# Patient Record
Sex: FEMALE | Race: WHITE | NOT HISPANIC OR LATINO | Employment: OTHER | URBAN - METROPOLITAN AREA
[De-identification: names, ages, dates, MRNs, and addresses within clinical notes are randomized per-mention and may not be internally consistent; named-entity substitution may affect disease eponyms.]

---

## 2018-08-08 ENCOUNTER — TRANSCRIBE ORDERS (OUTPATIENT)
Dept: ADMINISTRATIVE | Facility: HOSPITAL | Age: 68
End: 2018-08-08

## 2018-08-08 DIAGNOSIS — Z12.39 SCREENING BREAST EXAMINATION: Primary | ICD-10-CM

## 2018-08-22 ENCOUNTER — APPOINTMENT (OUTPATIENT)
Dept: WOMENS IMAGING | Facility: HOSPITAL | Age: 68
End: 2018-08-22

## 2018-08-22 PROCEDURE — 77080 DXA BONE DENSITY AXIAL: CPT | Performed by: RADIOLOGY

## 2018-08-22 PROCEDURE — 77067 SCR MAMMO BI INCL CAD: CPT | Performed by: RADIOLOGY

## 2018-08-22 PROCEDURE — 77063 BREAST TOMOSYNTHESIS BI: CPT | Performed by: RADIOLOGY

## 2019-12-26 ENCOUNTER — APPOINTMENT (OUTPATIENT)
Dept: WOMENS IMAGING | Facility: HOSPITAL | Age: 69
End: 2019-12-26

## 2019-12-26 PROCEDURE — 77067 SCR MAMMO BI INCL CAD: CPT | Performed by: RADIOLOGY

## 2019-12-26 PROCEDURE — 77063 BREAST TOMOSYNTHESIS BI: CPT | Performed by: RADIOLOGY

## 2021-01-11 ENCOUNTER — APPOINTMENT (OUTPATIENT)
Dept: WOMENS IMAGING | Facility: HOSPITAL | Age: 71
End: 2021-01-11

## 2021-01-11 PROCEDURE — 77063 BREAST TOMOSYNTHESIS BI: CPT | Performed by: RADIOLOGY

## 2021-01-11 PROCEDURE — 77067 SCR MAMMO BI INCL CAD: CPT | Performed by: RADIOLOGY

## 2021-03-22 ENCOUNTER — BULK ORDERING (OUTPATIENT)
Dept: CASE MANAGEMENT | Facility: OTHER | Age: 71
End: 2021-03-22

## 2021-03-22 DIAGNOSIS — Z23 IMMUNIZATION DUE: ICD-10-CM

## 2022-04-08 ENCOUNTER — APPOINTMENT (OUTPATIENT)
Dept: WOMENS IMAGING | Facility: HOSPITAL | Age: 72
End: 2022-04-08

## 2022-04-08 PROCEDURE — 77063 BREAST TOMOSYNTHESIS BI: CPT | Performed by: RADIOLOGY

## 2022-04-08 PROCEDURE — 77067 SCR MAMMO BI INCL CAD: CPT | Performed by: RADIOLOGY

## 2022-07-25 ENCOUNTER — APPOINTMENT (OUTPATIENT)
Dept: WOMENS IMAGING | Facility: HOSPITAL | Age: 72
End: 2022-07-25

## 2022-07-25 PROCEDURE — 77080 DXA BONE DENSITY AXIAL: CPT | Performed by: RADIOLOGY

## 2024-07-17 ENCOUNTER — APPOINTMENT (OUTPATIENT)
Dept: WOMENS IMAGING | Facility: HOSPITAL | Age: 74
End: 2024-07-17
Payer: MEDICARE

## 2024-07-17 PROCEDURE — 77067 SCR MAMMO BI INCL CAD: CPT | Performed by: RADIOLOGY

## 2024-07-17 PROCEDURE — 77063 BREAST TOMOSYNTHESIS BI: CPT | Performed by: RADIOLOGY

## 2025-04-17 ENCOUNTER — APPOINTMENT (OUTPATIENT)
Dept: ULTRASOUND IMAGING | Facility: HOSPITAL | Age: 75
End: 2025-04-17
Payer: MEDICARE

## 2025-04-17 ENCOUNTER — HOSPITAL ENCOUNTER (EMERGENCY)
Facility: HOSPITAL | Age: 75
Discharge: HOME | End: 2025-04-17
Attending: STUDENT IN AN ORGANIZED HEALTH CARE EDUCATION/TRAINING PROGRAM
Payer: MEDICARE

## 2025-04-17 VITALS
HEART RATE: 71 BPM | TEMPERATURE: 97.9 F | DIASTOLIC BLOOD PRESSURE: 89 MMHG | WEIGHT: 180 LBS | BODY MASS INDEX: 27.28 KG/M2 | HEIGHT: 68 IN | OXYGEN SATURATION: 99 % | RESPIRATION RATE: 16 BRPM | SYSTOLIC BLOOD PRESSURE: 178 MMHG

## 2025-04-17 DIAGNOSIS — M25.562 ACUTE PAIN OF LEFT KNEE: Primary | ICD-10-CM

## 2025-04-17 PROCEDURE — 99283 EMERGENCY DEPT VISIT LOW MDM: CPT | Performed by: NURSE PRACTITIONER

## 2025-04-17 PROCEDURE — 99284 EMERGENCY DEPT VISIT MOD MDM: CPT

## 2025-04-17 PROCEDURE — 93971 EXTREMITY STUDY: CPT

## 2025-04-18 NOTE — FSED PROVIDER NOTE
Subjective   History of Present Illness  74 yr old female presents with C/O knot behind her left knee and pain off and in the area.  She is worried it is a DVT and wants to have it checked.  She has been having the pain off and on for the past 4-6 weeks.  Denies numbness or tingling in lower extremity.  She says her ankles sometimes swell but not recently.  No known injury to left.        Review of Systems   Constitutional: Negative.    Respiratory: Negative.     Cardiovascular: Negative.    Genitourinary: Negative.    Musculoskeletal:  Positive for arthralgias.   Skin: Negative.    Neurological: Negative.        History reviewed. No pertinent past medical history.    Allergies   Allergen Reactions    No Known Drug Allergy        Past Surgical History:   Procedure Laterality Date    CERVICAL CONIZATION      PALATE TO GINGIVA GRAFT         Family History   Problem Relation Age of Onset    Cancer Mother         cervical    Heart disease Mother     Heart attack Father     Alcohol abuse Father     Cancer Sister         cervical       Social History     Socioeconomic History    Marital status: Single   Tobacco Use    Smoking status: Never    Smokeless tobacco: Never   Substance and Sexual Activity    Alcohol use: Yes     Alcohol/week: 2.0 standard drinks of alcohol     Types: 2 Glasses of wine per week    Drug use: No           Objective   Physical Exam  Vitals and nursing note reviewed.   Constitutional:       Appearance: Normal appearance. She is normal weight.   Pulmonary:      Effort: Pulmonary effort is normal.      Breath sounds: Normal breath sounds and air entry.   Musculoskeletal:      Left knee: Tenderness present. Normal pulse.      Comments: Full ROM left leg and knee but says it hurts behind the knee to bend it.  No swelling or redness.  No warmth.  Negative Kyle's sign.     Neurological:      Mental Status: She is alert.         Procedures           ED Course  ED Course as of 04/17/25 2313   Thu Apr 17,  2025 2200 US Venous Doppler Lower Extremity Left (duplex)  Reviewed results with patient.  No DVT, no baker's cyst.  No emergent findings.  Will have her follow up with ortho for further evaluation. [JJ]      ED Course User Index  [JJ] Delmy Dye, CIRO                                         Medical Decision Making      Final diagnoses:   Acute pain of left knee       ED Disposition  ED Disposition       ED Disposition   Discharge    Condition   Stable    Comment   --               Diony Peters MD  [address] [address]  [phone]    Call in 1 day  schedule an appointment         Medication List      No changes were made to your prescriptions during this visit.

## 2025-04-23 ENCOUNTER — TELEPHONE (OUTPATIENT)
Dept: ORTHOPEDIC SURGERY | Facility: CLINIC | Age: 75
End: 2025-04-23

## 2025-04-23 NOTE — TELEPHONE ENCOUNTER
Hub staff attempted to follow warm transfer process and was unsuccessful     Caller: Lamar Abel    Relationship to patient: Self    Best call back number: [phone]    Patient is needing: PT STATES SHE MISSED A CALL TO RESCHEDULE APPT WITH DR DISLA. UNABLE TO LOCATE APPT OR MSG IN CHART. APPEARS TO BE IN RELATION TO REFERRAL FROM 04/17/25. PLEASE CONTACT PT TO DISCUSS.

## 2025-04-25 ENCOUNTER — OFFICE VISIT (OUTPATIENT)
Dept: ORTHOPEDIC SURGERY | Facility: CLINIC | Age: 75
End: 2025-04-25
Payer: MEDICARE

## 2025-04-25 VITALS — BODY MASS INDEX: 27.96 KG/M2 | HEIGHT: 68 IN | TEMPERATURE: 98.4 F | WEIGHT: 184.5 LBS

## 2025-04-25 DIAGNOSIS — R52 PAIN: Primary | ICD-10-CM

## 2025-04-25 DIAGNOSIS — M17.10 ARTHRITIS OF KNEE: ICD-10-CM

## 2025-04-25 RX ORDER — METHYLPREDNISOLONE ACETATE 80 MG/ML
80 INJECTION, SUSPENSION INTRA-ARTICULAR; INTRALESIONAL; INTRAMUSCULAR; SOFT TISSUE
Status: COMPLETED | OUTPATIENT
Start: 2025-04-25 | End: 2025-04-25

## 2025-04-25 RX ORDER — LIDOCAINE HYDROCHLORIDE 10 MG/ML
2 INJECTION, SOLUTION EPIDURAL; INFILTRATION; INTRACAUDAL; PERINEURAL
Status: COMPLETED | OUTPATIENT
Start: 2025-04-25 | End: 2025-04-25

## 2025-04-25 RX ORDER — LEVOTHYROXINE SODIUM 75 UG/1
75 TABLET ORAL
COMMUNITY

## 2025-04-25 RX ORDER — CHOLECALCIFEROL (VITAMIN D3) 25 MCG
1000 TABLET ORAL DAILY
COMMUNITY

## 2025-04-25 RX ADMIN — LIDOCAINE HYDROCHLORIDE 2 ML: 10 INJECTION, SOLUTION EPIDURAL; INFILTRATION; INTRACAUDAL; PERINEURAL at 11:15

## 2025-04-25 RX ADMIN — METHYLPREDNISOLONE ACETATE 80 MG: 80 INJECTION, SUSPENSION INTRA-ARTICULAR; INTRALESIONAL; INTRAMUSCULAR; SOFT TISSUE at 11:15

## 2025-04-25 NOTE — PROGRESS NOTES
Patient: Lamar Abel    YOB: 1950    Medical Record Number: [id number]    Chief Complaints:  Left knee pain    History of Present Illness:     75 y.o. female patient who presents for evaluation of left knee pain. She reports that the symptoms first began about 2 months ago.  She recalls that she was doing a lot of leg extensions in the gym and the knee started to bother her afterwards.  Unfortunately, she has continued to have pain in the back of her knee ever since.  Current pain is described as moderate and aching.  She denies any clicking, popping or catching.  Symptoms are worse with activity.  Symptoms are somewhat better with rest and anti-inflammatories.  Denies any shooting pain down the legs, weakness, numbness or paresthesias.  She has been doing some online research and she is concerned that she may have a Baker's cyst.  She did have a negative duplex ultrasound just about a week ago.    Allergies: No Known Allergies    Home Medications    Current Outpatient Medications:     [facility] Natural Products (BEET ROOT PO), Take  by mouth., Disp: , Rfl:     acyclovir (ZOVIRAX) 200 MG capsule, Take 1 capsule by mouth 5 (five) times a day., Disp: 25 capsule, Rfl: 3    cholecalciferol (SM Vitamin D3) 25 MCG (1000 UT) tablet, Take 1 tablet by mouth Daily., Disp: , Rfl:     CYANOCOBALAMIN PO, Take  by mouth., Disp: , Rfl:     Glucosamine-Chondroitin 750-600 MG tablet, Take  by mouth., Disp: , Rfl:     levothyroxine (SYNTHROID, LEVOTHROID) 75 MCG tablet, Take 1 tablet by mouth., Disp: , Rfl:     Multiple Vitamin (MULTI VITAMIN DAILY PO), Take  by mouth., Disp: , Rfl:     Vaginal Lubricant (REPLENS) gel, Insert  into the vagina., Disp: , Rfl:     History reviewed. No pertinent past medical history.    Past Surgical History:   Procedure Laterality Date    CERVICAL CONIZATION      PALATE TO GINGIVA GRAFT         Social History     Occupational History    Not on file   Tobacco Use    Smoking status: Never  "    Passive exposure: Never    Smokeless tobacco: Never   Vaping Use    Vaping status: Never Used   Substance and Sexual Activity    Alcohol use: Yes     Alcohol/week: 2.0 standard drinks of alcohol     Types: 2 Glasses of wine per week    Drug use: No    Sexual activity: Defer      Social History     Social History Narrative    Not on file       Family History   Problem Relation Age of Onset    Cancer Mother         cervical    Heart disease Mother     Heart attack Father     Alcohol abuse Father     Cancer Sister         cervical       Review of Systems:      Constitutional: Denies fever, shaking or chills   Eyes: Denies change in visual acuity   HEENT: Denies nasal congestion or sore throat   Respiratory: Denies cough or shortness of breath   Cardiovascular: Denies chest pain or edema  Endocrine: Denies tremors, palpitations, intolerance of heat or cold, polyuria, polydipsia.  GI: Denies abdominal pain, nausea, vomiting, bloody stools or diarrhea  : Denies frequency, urgency, incontinence, retention, or nocturia.  Musculoskeletal: Denies numbness, tingling or loss of motor function except as above  Integument: Denies rash, lesion or ulceration   Neurologic: Denies headache or focal weakness, deficits  Heme: Denies spontaneous or excessive bleeding, epistaxis, hematuria, melena, fatigue, enlarged or tender lymph nodes.      All other pertinent positives and negatives as noted above in HPI.    Physical Exam:   75 y.o. female  Vitals:    04/25/25 1057   Temp: 98.4 °F (36.9 °C)   Weight: 83.7 kg (184 lb 8 oz)   Height: 172.7 cm (68\")     General:  Patient is awake and alert.  Appears in no acute distress or discomfort.    Psych:  Affect and demeanor are appropriate.    Eyes:  Conjunctiva and sclera appear grossly normal.  Eyes track well and EOM seem to be intact.    Ears:  No gross abnormalities.  Hearing adequate for the exam.    Cardiovascular:  Regular rate and rhythm.    Lungs:  Good chest expansion.  " Breathing unlabored.    Spine:  Back appears grossly normal.  No palpable masses or adenopathy.  Good motion.  Straight leg raise and crossed straight leg raise maneuver are both negative for lower leg and/or knee pain.    Extremities:  Left knee is examined.  Skin is benign.  No obvious gross abnormalities.  No palpable masses or adenopathy.  Mild tenderness noted over medial joint line.  Motion is full and symmetric to the other side.  She has a positive medial Tess's.  Negative lateral Tess's.  No instability.  Strength is well preserved including hip flexion, knee extension, ankle and toe plantarflexion, ankle inversion and eversion.  Good sensory function throughout the leg and foot.  Palpable pulses.  Brisk capillary refill.  Good skin turgor.         Radiology:   Bilateral standing AP views, bilateral merchants views and a lateral view of the left knee are ordered by myself and reviewed to evaluate the patient's complaint.  No comparison films are immediately available.  The x-rays show some subtle osseous debris and early osteophyte formation consistent with early osteoarthritis.  Her joint spaces appear well-preserved.  I do not see any other concerning findings.    I did review the report of her duplex ultrasound from April 17.  This was read as negative.  No Sommer's cyst was noted.    Assessment/Plan:  Left knee osteoarthritis and possible medial meniscal tear    We discussed her suspected diagnosis and the natural history of these conditions.  We discussed treatment options in detail including the risks, benefits, and alternatives of conservative treatment versus surgical options.  Regarding conservative treatment, we discussed appropriate activity modifications, anti-inflammatories, injections (including both corticosteroids and visco supplementation), and physical therapy.  She acknowledged understanding of the information and elected for an injection.  The risk, benefits and alternatives to a  left knee injection were discussed.  The patient consented and the injection was performed as described below.  I also gave her a referral to PT.  I recommended she give this course of treatment roughly 6 weeks and then contact me if no better.  Going forward, she we will follow-up as needed.      Large Joint Arthrocentesis: L knee  Date/Time: 4/25/2025 11:15 AM  Consent given by: patient  Site marked: site marked  Timeout: Immediately prior to procedure a time out was called to verify the correct patient, procedure, equipment, support staff and site/side marked as required   Supporting Documentation  Indications: pain   Procedure Details  Location: knee - L knee  Preparation: Patient was prepped and draped in the usual sterile fashion  Needle gauge: 21 G.  Approach: anterolateral  Medications administered: 2 mL lidocaine PF 1% 1 %; 80 mg methylPREDNISolone acetate 80 MG/ML  Patient tolerance: patient tolerated the procedure well with no immediate complications        Diony Peters MD    04/25/2025    CC to Sung Purvis MD

## 2025-05-06 ENCOUNTER — TELEPHONE (OUTPATIENT)
Dept: ORTHOPEDIC SURGERY | Facility: CLINIC | Age: 75
End: 2025-05-06

## 2025-05-06 NOTE — TELEPHONE ENCOUNTER
Provider: NAIF    Caller: Lamar Abel    Relationship to Patient: Self    Pharmacy:     Phone Number: [phone]    Reason for Call: PT CALLED TO RETURN ИВАН AYERS

## 2025-05-08 ENCOUNTER — TREATMENT (OUTPATIENT)
Dept: PHYSICAL THERAPY | Facility: CLINIC | Age: 75
End: 2025-05-08
Payer: MEDICARE

## 2025-05-08 DIAGNOSIS — R68.89 ACTIVITY INTOLERANCE: ICD-10-CM

## 2025-05-08 DIAGNOSIS — M62.89 HAMSTRING TIGHTNESS OF LEFT LOWER EXTREMITY: ICD-10-CM

## 2025-05-08 DIAGNOSIS — M17.12 OSTEOARTHRITIS OF LEFT KNEE, UNSPECIFIED OSTEOARTHRITIS TYPE: Primary | ICD-10-CM

## 2025-05-08 NOTE — PROGRESS NOTES
Physical Therapy Initial Evaluation and Plan of Care  [facility] [place]   [address], [address]  [place] [address]  P: [phone]  F: [phone]    Patient: Lamar Abel   : 1950  Diagnosis/ICD-10 Code:  Osteoarthritis of left knee, unspecified osteoarthritis type [M17.12]  Referring practitioner: Diony Peters MD  Date of Initial Visit: 2025  Today's Date: 2025  Patient seen for 1 session         Visit Diagnoses:    ICD-10-CM ICD-9-CM   1. Osteoarthritis of left knee, unspecified osteoarthritis type  M17.12 715.96   2. Hamstring tightness of left lower extremity  M62.89 728.9   3. Activity intolerance  R68.89 780.99       PMHx Reviewed : 2025      Subjective Evaluation    History of Present Illness  Mechanism of injury: Hx:   Pt reports to clinic for IE of L knee arthritis. Pt reports she feels she may have an issue w/ her mensicus as well. Pt notes this started back in Feb. Pt was working out and felt pain in her knee the next day. Pt saw ortho and had Xray done. Pt has been wearing a knee sleeve which has been helpful. Pt denies any N/T in her leg.     PMH includes:  - N/A    Pt reported difficulties:  - self reported fxn status =  90/100%  - walking (1.5 mile before pain, used to walk 3-4)  - stairs (up)      Hobbies (impacted by dysfxn):  - gym (machine circuit and treadmill)       Patient Occupation: retired Quality of life: excellent    Pain  Current pain ratin  At best pain ratin  At worst pain ratin  Location: back of knee  Quality: tight and dull ache  Relieving factors: support  Aggravating factors: stairs and ambulation  Progression: improved    Diagnostic Tests  Abnormal x-ray: OA.    Patient Goals  Patient goals for therapy: return to sport/leisure activities             Objective          Tenderness   Left Knee   Tenderness in the medial joint line.     Active Range of Motion   Left Knee   Flexion: 130 degrees   Prone  flexion: 0 degrees     Right Knee   Flexion: 130 degrees   Prone flexion: 0 degrees     Patellar Mobility   Left Knee Patellar tendons within functional limits include the medial, lateral, superior and inferior.     Right Knee Patellar tendons within functional limits include the medial, lateral, superior and inferior.     Strength/Myotome Testing     Left Hip   Planes of Motion   Flexion: 4  Abduction: 5  Adduction: 5  External rotation: 4+  Internal rotation: 4+    Right Hip   Planes of Motion   Flexion: 4  Abduction: 5  Adduction: 5  External rotation: 4+  Internal rotation: 4+    Left Knee   Flexion: 5  Extension: 5    Right Knee   Flexion: 5  Extension: 5    Tests     Left Knee   Negative lateral Tess, medial Tess, valgus stress test at 0 degrees, valgus stress test at 30 degrees, varus stress test at 0 degrees and varus stress test at 30 degrees.     Right Knee   Negative lateral Tess, medial Tess, Thessaly's test at 20 degrees, valgus stress test at 0 degrees, valgus stress test at 30 degrees, varus stress test at 0 degrees and varus stress test at 30 degrees.     Additional Tests Details  90/90 HS   L = 25   R = 10    Ambulation     Ambulation: Stairs     Additional Stairs Ambulation Details  Slight pain w/ descent of stairs     Observational Gait   Gait: within functional limits           Assessment & Plan       Assessment  Impairments: abnormal coordination, abnormal gait, abnormal or restricted ROM, activity intolerance, impaired balance, impaired physical strength, lacks appropriate home exercise program, pain with function and weight-bearing intolerance   Functional limitations: carrying objects, lifting, walking, pulling, pushing, uncomfortable because of pain, sitting, standing and unable to perform repetitive tasks   Assessment details: Pt presents to clinic for IE of L knee arthritis. Pt demonstrates expected clinical presentation of LE weakness, decreased ROM, pain and activity  intolerance, gait/balance disturbances. Pt negative for meniscus pathology testing. Clinical examination findings suggestive of potential L hamstring strain. Pt will benefit from skilled PT services at this time to address found dysfxn in order to achieve outlined goals in POC for pt fxn mobility, safety, and return to PLOF.   Prognosis: good    Goals  Plan Goals: STG [achieve in 4 wks]   1. Pt will be compliant w/ HEP and attendance w/ scheduled therapy sessions.   2. Pt will demonstrate 4+/5 LE strength in order to improve gait, fxn mobility, and activity level.  3. Pt will increase L HS ROM in 90/90 test to symmetrical degrees of R for improved fxn mobility in completion of IADLs and return to or exceed PLOF.   4. Pt will report decreased pain levels from 7/10 at worst to 5/10 or less for pt QoL and return to PLOF.               Plan  Therapy options: will be seen for skilled therapy services  Planned modality interventions: cryotherapy, dry needling, iontophoresis and TENS  Planned therapy interventions: abdominal trunk stabilization, balance/weight-bearing training, body mechanics training, fine motor coordination training, flexibility, functional ROM exercises, gait training, home exercise program, joint mobilization, therapeutic activities, stretching, strengthening, postural training, orthotic fitting/training, neuromuscular re-education and motor coordination training  Frequency: 1x week  Duration in weeks: 4  Treatment plan discussed with: patient          Manual Therapy:     0     mins  78258;  Therapeutic Exercise:     20     mins  84447;     Neuromuscular Jennifer:       8    mins  20649;    Therapeutic Activity:      10     mins  67747;     Gait Trainin     mins  98827;     Ultrasound:      0     mins  19214;    Electrical Stimulation:     0     mins  97483 ( );  Dry Needling      0     mins self-pay  Traction      0     mins 01037  Canalith Repositioning    0     mins 16333      Timed  Treatment:   38   mins   Total Treatment:     58   mins      PT: Bethel Alcocer PT     License Number: [id number]  Electronically signed by Bethel Alcocer PT, 05/08/25, 1:51 PM EDT    Certification Period: 5/8/2025 thru 8/5/2025  I certify that the therapy services are furnished while this patient is under my care.  The services outlined above are required by this patient, and will be reviewed every 90 days.         Physician Signature:__________________________________________________    PHYSICIAN: Diony Peters MD  NPI: [id number]                                      DATE:      Please sign in Epic or return via fax to [email or web address] . Thank you, [facility].

## 2025-05-21 ENCOUNTER — TELEPHONE (OUTPATIENT)
Dept: ORTHOPEDIC SURGERY | Facility: CLINIC | Age: 75
End: 2025-05-21
Payer: MEDICARE

## 2025-05-21 NOTE — TELEPHONE ENCOUNTER
Caller: LING    Relationship: [employer]     Best call back number:     What form or medical record are you requesting: PA FOR  LUMBAR, BILATERAL KNEE AND LEFT SHOULDER BRACES    Who is requesting this form or medical record from you: [employer]    How would you like to receive the form or medical records (pick-up, mail, fax): FAX  If fax, what is the fax number: [phone]    Timeframe paperwork needed: ASAP    Additional notes: LING STATED SHE FAXED PRIOR AUTH THAT NEEDS TO BE SIGNED AND RETURNED ON 5/16/25

## 2025-05-23 ENCOUNTER — TELEPHONE (OUTPATIENT)
Dept: ORTHOPEDIC SURGERY | Facility: CLINIC | Age: 75
End: 2025-05-23
Payer: MEDICARE

## 2025-05-23 NOTE — TELEPHONE ENCOUNTER
Caller: ANDREA    Relationship to patient: Other    Best call back number: [phone]    Patient is needing: ANDREA WITH [employer] CALLED STATING THEY RECEIVED A FAX TODAY FOR AN ORDER OF ORTHOTICS BUT ONE OF THE PAGES IS NOT LEGIBLE   THEY ARE ASKING THAT IT BE RE-SCANNED AND SENT AGAIN TODAY

## 2025-05-27 ENCOUNTER — TREATMENT (OUTPATIENT)
Dept: PHYSICAL THERAPY | Facility: CLINIC | Age: 75
End: 2025-05-27
Payer: MEDICARE

## 2025-05-27 DIAGNOSIS — R68.89 ACTIVITY INTOLERANCE: ICD-10-CM

## 2025-05-27 DIAGNOSIS — M17.12 OSTEOARTHRITIS OF LEFT KNEE, UNSPECIFIED OSTEOARTHRITIS TYPE: Primary | ICD-10-CM

## 2025-05-27 DIAGNOSIS — M62.89 HAMSTRING TIGHTNESS OF LEFT LOWER EXTREMITY: ICD-10-CM

## 2025-05-27 PROCEDURE — 97110 THERAPEUTIC EXERCISES: CPT | Performed by: PHYSICAL THERAPIST

## 2025-05-27 PROCEDURE — 97112 NEUROMUSCULAR REEDUCATION: CPT | Performed by: PHYSICAL THERAPIST

## 2025-05-27 PROCEDURE — 97164 PT RE-EVAL EST PLAN CARE: CPT | Performed by: PHYSICAL THERAPIST

## 2025-05-27 NOTE — PROGRESS NOTES
Physical Therapy Re-eval/Discharge  [facility] [place]   [address], [address]  [place] [address]  P: [phone]  F: [phone]    Patient: Lamar Abel   : 1950  Referring practitioner: Diony Peters MD  Date of Initial Visit: Type: THERAPY  Noted: 2025  Today's Date: 2025  Patient seen for 2 sessions       Visit Diagnoses:    ICD-10-CM ICD-9-CM   1. Osteoarthritis of left knee, unspecified osteoarthritis type  M17.12 715.96   2. Hamstring tightness of left lower extremity  M62.89 728.9   3. Activity intolerance  R68.89 780.99         Lamar Abel reports: Pt reports she is doing very well. Pt notes her pain has decreased quite a bit w/ only some occasional mild discomfort now. Pt has been doing her exercises most of the time but had some GI issues that made her miss a few times. No new/other complaints/comments noted.       Subjective     Objective   See Exercise, Manual, and Modality Logs for complete treatment.      Strength/Myotome Testing      Left Hip   Planes of Motion   Flexion: 4 = 4+  Abduction: 5  Adduction: 5  External rotation: 4+ = 5  Internal rotation: 4+ = 5     Right Hip   Planes of Motion   Flexion: 4 = 4+  Abduction: 5  Adduction: 5  External rotation: 4+ =5  Internal rotation: 4+ = 5     Left Knee   Flexion: 5  Extension: 5     Right Knee   Flexion: 5  Extension: 5     Tests      Left Knee   Negative lateral Tess, medial Tess, valgus stress test at 0 degrees, valgus stress test at 30 degrees, varus stress test at 0 degrees and varus stress test at 30 degrees.      Right Knee   Negative lateral Tess, medial Tess, Thessaly's test at 20 degrees, valgus stress test at 0 degrees, valgus stress test at 30 degrees, varus stress test at 0 degrees and varus stress test at 30 degrees.      Additional Tests Details  90/90 HS   L = 25 = 10  R = 10    Assessment/Plan  Pt was re-evaluated for potential d/c today. Pt has met  100% of goals from PoC. Pt is d/c from skilled PT services at this time w/ updated HEP. Therapist and patient are in agreement w/ this plan.       Goals  Plan Goals: STG [achieve in 4 wks]   1. Pt will be compliant w/ HEP and attendance w/ scheduled therapy sessions. = MET  2. Pt will demonstrate 4+/5 LE strength in order to improve gait, fxn mobility, and activity level. = MET  3. Pt will increase L HS ROM in 90/90 test to symmetrical degrees of R for improved fxn mobility in completion of IADLs and return to or exceed PLOF. = MET  4. Pt will report decreased pain levels from 7/10 at worst to 5/10 or less for pt QoL and return to PLOF. = MET (2/10)    Timed:         Manual Therapy:    0     mins  49301;     Therapeutic Exercise:    13     mins  07185;     Neuromuscular Jennifer:    10    mins  67600;    Therapeutic Activity:     0     mins  85844;     Gait Trainin     mins  69442;     Ultrasound:     0     mins  48377;    Ionto                               0    mins  58642  Self Care                       0     mins  30284  Traction     0     mins 66764      Un-Timed:  Canalith Repos    0     mins 78066  Electrical Stimulation:    0     mins  78568 ( );  Dry Needling     0     mins self-pay  Traction     0     mins 23343  PT Re-eval                       8       mins 02517       Timed Treatment:   23   mins   Total Treatment:     31   mins    Bethel Alcocer, PT  [place] License #: [id number]    Physical Therapist

## 2025-05-29 NOTE — TELEPHONE ENCOUNTER
"Caller: LING    Relationship: Other    Best call back number: [phone]    Who is requesting this form or medical record from you: [employer]    How would you like to receive the form or medical records (pick-up, mail, fax): FAX  If fax, what is the fax number: [phone]    Timeframe paperwork needed: ASAP    Additional notes: LING WITH [employer] CALLING TO RELAY THAT SHE FAXED OVER THE \"MAIN FORM\" FROM THIS THREAD TO BE SIGNED BY DR DISLA. LING STATES THIS MAIN FORM WAS NOT SIGNED WHEN PAPERWORK WAS SENT BACK ORIGINALLY.      "

## 2025-05-30 NOTE — TELEPHONE ENCOUNTER
I CALLED THE PATIENT THIS MORNING REGARDING [employer] AND SHE TOLD ME TO DISREGARD DUE TO HER HAVING MEDICARE AND THAT THIS [employer] IS A SCAM, I TRIED CALLING CORY COLLADO WITH [employer] BUT NO ONE ANSWERED AND I WAS NOT ABLE TO LEAVE A VOICE MAIL, I WILL TURN THIS OVER TO MY SUPERVISOR.

## 2025-06-17 ENCOUNTER — OFFICE (OUTPATIENT)
Dept: URBAN - METROPOLITAN AREA CLINIC 76 | Facility: CLINIC | Age: 75
End: 2025-06-17
Payer: MEDICARE

## 2025-06-17 VITALS
HEIGHT: 68 IN | SYSTOLIC BLOOD PRESSURE: 142 MMHG | WEIGHT: 175 LBS | DIASTOLIC BLOOD PRESSURE: 84 MMHG | OXYGEN SATURATION: 99 % | HEART RATE: 77 BPM

## 2025-06-17 DIAGNOSIS — R93.3 ABNORMAL FINDINGS ON DIAGNOSTIC IMAGING OF OTHER PARTS OF DI: ICD-10-CM

## 2025-06-17 DIAGNOSIS — K92.89 OTHER SPECIFIED DISEASES OF THE DIGESTIVE SYSTEM: ICD-10-CM

## 2025-06-17 DIAGNOSIS — R19.4 CHANGE IN BOWEL HABIT: ICD-10-CM

## 2025-06-17 DIAGNOSIS — K57.90 DIVERTICULOSIS OF INTESTINE, PART UNSPECIFIED, WITHOUT PERFO: ICD-10-CM

## 2025-06-17 DIAGNOSIS — K62.5 HEMORRHAGE OF ANUS AND RECTUM: ICD-10-CM

## 2025-06-17 PROCEDURE — 99204 OFFICE O/P NEW MOD 45 MIN: CPT | Performed by: INTERNAL MEDICINE

## 2025-06-17 NOTE — SERVICEHPINOTES
Ms. Wing is a very pleasant, healthy 75-year-old female with a history of diverticular disease, thyroid disease and mild sleep apnea.  Her last colonoscopy was in 2013, she had a cold garden 2023.  She's here now however because she had a change in bowel habits a month ago, she's had increased bright red blood per rectum and passage of mucus.  She's had gas and bloating.  She's had these bowel changes in spite of the fact that there is no change in her diet.  She's had abdominal pain, workup she had a CT scan done in May and did have sigmoid inflammation which was read as sigmoid colitis versus possible diverticulitis.  She was treated with antibiotics.  She was on Flagyl which caused nausea.  She's had some constipation.  She's been using MiraLAX. There is no first-degree relatives of polyps colitis or colon cancer.  She is in no acute distress.  She is on no nonsteroidals or blood thinners.

## 2025-07-03 VITALS
HEART RATE: 60 BPM | HEART RATE: 66 BPM | HEART RATE: 59 BPM | HEART RATE: 61 BPM | RESPIRATION RATE: 15 BRPM | OXYGEN SATURATION: 94 % | SYSTOLIC BLOOD PRESSURE: 134 MMHG | SYSTOLIC BLOOD PRESSURE: 174 MMHG | TEMPERATURE: 97.1 F | SYSTOLIC BLOOD PRESSURE: 169 MMHG | OXYGEN SATURATION: 97 % | SYSTOLIC BLOOD PRESSURE: 175 MMHG | HEART RATE: 69 BPM | HEART RATE: 75 BPM | SYSTOLIC BLOOD PRESSURE: 140 MMHG | SYSTOLIC BLOOD PRESSURE: 188 MMHG | DIASTOLIC BLOOD PRESSURE: 99 MMHG | WEIGHT: 173 LBS | SYSTOLIC BLOOD PRESSURE: 153 MMHG | HEART RATE: 67 BPM | HEART RATE: 64 BPM | DIASTOLIC BLOOD PRESSURE: 74 MMHG | DIASTOLIC BLOOD PRESSURE: 85 MMHG | OXYGEN SATURATION: 100 % | DIASTOLIC BLOOD PRESSURE: 120 MMHG | RESPIRATION RATE: 9 BRPM | DIASTOLIC BLOOD PRESSURE: 82 MMHG | HEIGHT: 68 IN | DIASTOLIC BLOOD PRESSURE: 90 MMHG | HEART RATE: 55 BPM | TEMPERATURE: 97.4 F | SYSTOLIC BLOOD PRESSURE: 142 MMHG | SYSTOLIC BLOOD PRESSURE: 118 MMHG | RESPIRATION RATE: 16 BRPM | OXYGEN SATURATION: 98 % | DIASTOLIC BLOOD PRESSURE: 70 MMHG | DIASTOLIC BLOOD PRESSURE: 71 MMHG | DIASTOLIC BLOOD PRESSURE: 61 MMHG | RESPIRATION RATE: 14 BRPM | SYSTOLIC BLOOD PRESSURE: 130 MMHG | RESPIRATION RATE: 19 BRPM

## 2025-07-07 PROBLEM — K92.2 EVALUATION OF UNEXPLAINED GI BLEEDING: Status: ACTIVE | Noted: 2025-07-08

## 2025-07-08 ENCOUNTER — OFFICE (AMBULATORY)
Dept: URBAN - METROPOLITAN AREA PATHOLOGY 4 | Facility: PATHOLOGY | Age: 75
End: 2025-07-08
Payer: MEDICARE

## 2025-07-08 ENCOUNTER — AMBULATORY SURGICAL CENTER (AMBULATORY)
Dept: URBAN - METROPOLITAN AREA SURGERY 17 | Facility: SURGERY | Age: 75
End: 2025-07-08
Payer: MEDICARE

## 2025-07-08 DIAGNOSIS — K62.1 RECTAL POLYP: ICD-10-CM

## 2025-07-08 DIAGNOSIS — D12.3 BENIGN NEOPLASM OF TRANSVERSE COLON: ICD-10-CM

## 2025-07-08 DIAGNOSIS — D12.0 BENIGN NEOPLASM OF CECUM: ICD-10-CM

## 2025-07-08 DIAGNOSIS — K57.30 DIVERTICULOSIS OF LARGE INTESTINE WITHOUT PERFORATION OR ABS: ICD-10-CM

## 2025-07-08 DIAGNOSIS — K62.5 HEMORRHAGE OF ANUS AND RECTUM: ICD-10-CM

## 2025-07-08 PROBLEM — K63.5 POLYP OF COLON: Status: ACTIVE | Noted: 2025-07-08

## 2025-07-08 PROCEDURE — 88305 TISSUE EXAM BY PATHOLOGIST: CPT | Performed by: PATHOLOGY

## 2025-07-08 PROCEDURE — 45385 COLONOSCOPY W/LESION REMOVAL: CPT | Performed by: INTERNAL MEDICINE
